# Patient Record
Sex: FEMALE | Race: WHITE | NOT HISPANIC OR LATINO | URBAN - METROPOLITAN AREA
[De-identification: names, ages, dates, MRNs, and addresses within clinical notes are randomized per-mention and may not be internally consistent; named-entity substitution may affect disease eponyms.]

---

## 2019-03-25 ENCOUNTER — EMERGENCY (EMERGENCY)
Facility: HOSPITAL | Age: 29
LOS: 1 days | End: 2019-03-25
Attending: EMERGENCY MEDICINE
Payer: COMMERCIAL

## 2019-03-25 VITALS
OXYGEN SATURATION: 100 % | TEMPERATURE: 98 F | HEART RATE: 87 BPM | SYSTOLIC BLOOD PRESSURE: 130 MMHG | DIASTOLIC BLOOD PRESSURE: 70 MMHG | WEIGHT: 134.92 LBS | HEIGHT: 67 IN | RESPIRATION RATE: 17 BRPM

## 2019-03-25 VITALS
RESPIRATION RATE: 16 BRPM | DIASTOLIC BLOOD PRESSURE: 76 MMHG | SYSTOLIC BLOOD PRESSURE: 127 MMHG | TEMPERATURE: 99 F | HEART RATE: 80 BPM | OXYGEN SATURATION: 100 %

## 2019-03-25 LAB
ALBUMIN SERPL ELPH-MCNC: 4.8 G/DL — SIGNIFICANT CHANGE UP (ref 3.3–5)
ALP SERPL-CCNC: 88 U/L — SIGNIFICANT CHANGE UP (ref 40–120)
ALT FLD-CCNC: 16 U/L — SIGNIFICANT CHANGE UP (ref 10–45)
ANION GAP SERPL CALC-SCNC: 13 MMOL/L — SIGNIFICANT CHANGE UP (ref 5–17)
AST SERPL-CCNC: 15 U/L — SIGNIFICANT CHANGE UP (ref 10–40)
BASOPHILS # BLD AUTO: 0.1 K/UL — SIGNIFICANT CHANGE UP (ref 0–0.2)
BASOPHILS NFR BLD AUTO: 0.7 % — SIGNIFICANT CHANGE UP (ref 0–2)
BILIRUB SERPL-MCNC: 0.3 MG/DL — SIGNIFICANT CHANGE UP (ref 0.2–1.2)
BUN SERPL-MCNC: 9 MG/DL — SIGNIFICANT CHANGE UP (ref 7–23)
CALCIUM SERPL-MCNC: 9.6 MG/DL — SIGNIFICANT CHANGE UP (ref 8.4–10.5)
CHLORIDE SERPL-SCNC: 103 MMOL/L — SIGNIFICANT CHANGE UP (ref 96–108)
CO2 SERPL-SCNC: 23 MMOL/L — SIGNIFICANT CHANGE UP (ref 22–31)
CREAT SERPL-MCNC: 0.72 MG/DL — SIGNIFICANT CHANGE UP (ref 0.5–1.3)
EOSINOPHIL # BLD AUTO: 0.1 K/UL — SIGNIFICANT CHANGE UP (ref 0–0.5)
EOSINOPHIL NFR BLD AUTO: 1.2 % — SIGNIFICANT CHANGE UP (ref 0–6)
GLUCOSE SERPL-MCNC: 100 MG/DL — HIGH (ref 70–99)
HCG SERPL-ACNC: <2 MIU/ML — SIGNIFICANT CHANGE UP
HCT VFR BLD CALC: 42.7 % — SIGNIFICANT CHANGE UP (ref 34.5–45)
HGB BLD-MCNC: 14.7 G/DL — SIGNIFICANT CHANGE UP (ref 11.5–15.5)
LIDOCAIN IGE QN: 203 U/L — HIGH (ref 7–60)
LYMPHOCYTES # BLD AUTO: 2.9 K/UL — SIGNIFICANT CHANGE UP (ref 1–3.3)
LYMPHOCYTES # BLD AUTO: 25.2 % — SIGNIFICANT CHANGE UP (ref 13–44)
MCHC RBC-ENTMCNC: 29.4 PG — SIGNIFICANT CHANGE UP (ref 27–34)
MCHC RBC-ENTMCNC: 34.5 GM/DL — SIGNIFICANT CHANGE UP (ref 32–36)
MCV RBC AUTO: 85.3 FL — SIGNIFICANT CHANGE UP (ref 80–100)
MONOCYTES # BLD AUTO: 0.7 K/UL — SIGNIFICANT CHANGE UP (ref 0–0.9)
MONOCYTES NFR BLD AUTO: 6.6 % — SIGNIFICANT CHANGE UP (ref 2–14)
NEUTROPHILS # BLD AUTO: 7.5 K/UL — HIGH (ref 1.8–7.4)
NEUTROPHILS NFR BLD AUTO: 66.4 % — SIGNIFICANT CHANGE UP (ref 43–77)
PLATELET # BLD AUTO: 377 K/UL — SIGNIFICANT CHANGE UP (ref 150–400)
POTASSIUM SERPL-MCNC: 3.8 MMOL/L — SIGNIFICANT CHANGE UP (ref 3.5–5.3)
POTASSIUM SERPL-SCNC: 3.8 MMOL/L — SIGNIFICANT CHANGE UP (ref 3.5–5.3)
PROT SERPL-MCNC: 7.9 G/DL — SIGNIFICANT CHANGE UP (ref 6–8.3)
RBC # BLD: 5 M/UL — SIGNIFICANT CHANGE UP (ref 3.8–5.2)
RBC # FLD: 13 % — SIGNIFICANT CHANGE UP (ref 10.3–14.5)
SODIUM SERPL-SCNC: 139 MMOL/L — SIGNIFICANT CHANGE UP (ref 135–145)
WBC # BLD: 11.4 K/UL — HIGH (ref 3.8–10.5)
WBC # FLD AUTO: 11.4 K/UL — HIGH (ref 3.8–10.5)

## 2019-03-25 PROCEDURE — 85027 COMPLETE CBC AUTOMATED: CPT

## 2019-03-25 PROCEDURE — 99284 EMERGENCY DEPT VISIT MOD MDM: CPT

## 2019-03-25 PROCEDURE — 84702 CHORIONIC GONADOTROPIN TEST: CPT

## 2019-03-25 PROCEDURE — 80053 COMPREHEN METABOLIC PANEL: CPT

## 2019-03-25 PROCEDURE — 76705 ECHO EXAM OF ABDOMEN: CPT

## 2019-03-25 PROCEDURE — 99284 EMERGENCY DEPT VISIT MOD MDM: CPT | Mod: 25

## 2019-03-25 PROCEDURE — 76705 ECHO EXAM OF ABDOMEN: CPT | Mod: 26,RT

## 2019-03-25 PROCEDURE — 83690 ASSAY OF LIPASE: CPT

## 2019-03-25 RX ORDER — ONDANSETRON 8 MG/1
1 TABLET, FILM COATED ORAL
Qty: 12 | Refills: 0 | OUTPATIENT
Start: 2019-03-25

## 2019-03-25 NOTE — ED STATDOCS - OBJECTIVE STATEMENT
27 y/o female with no significant pmhx or hx of gallstones c/o intermittent epigastric pain today. Pt went to urgent care and performed blood test which showed elevated lipase levels (300). Pt was directed to ED for further eval. Has pain in room. Denies daily drinking or binge drinking; last drink x4 days ago (3.5 beers). LNMP: 1.5 weeks ago.    **pt seen in waiting room for triage - comprehensive history and physical is not performed by me - patient to be sent to main ED for full history / physical and medical evaluation - all orders placed by me to be followed by MD in main ED**

## 2019-03-25 NOTE — ED ADULT NURSE NOTE - OBJECTIVE STATEMENT
29 y/o F presents to the ED c/o abnormal labs.  Pt reports upper abdominal pain which started late last night.  Pt states the pain is mild to moderate and states the pain continued today.  Pt states earlier she went to urgent care for the pain and was sent to ED for high lipase level.  Pt reports mild nausea in ED.  Pt state she is a "occasional social drinker".  Patient is A&Ox4. Face is symmetrical. PERRL 3mmB. Speech is clear. Patient is moving all extremities with 5/5 strength and walks with steady gait. No chest pain, shortness of breath, diaphoresis, palpitations, left arm pain.  VSS,      Patient reporting upper abdominal pains beginning last night.  Moderate intensity, steady throughout day, no nausea, vomiting, or diarrhea.  Non radiating.  No similar prior pains in past.  No change with eating and no appetite changes, no prior surgeries.  Occasional social drinker.  Seen at urgent care earlier for same, had labs sent with reportedly elevated amylase/lipase there.

## 2019-03-25 NOTE — ED PROVIDER NOTE - NSFOLLOWUPCLINICS_GEN_ALL_ED_FT
[facility]  Gastroenterology  [address] [place] [id number]  Phone: [phone]  Fax:   Follow Up Time:

## 2019-03-25 NOTE — ED PROVIDER NOTE - OBJECTIVE STATEMENT
Patient reporting upper abdominal pains beginning last night.  Moderate intensity, steady throughout day, no nausea, vomiting, or diarrhea.  Non radiating.  No similar prior pains in past.  No change with eating and no appetite changes, no prior surgeries.  Occasional social drinker.  Seen at urgent care earlier for same, had labs sent with reportedly elevated amylase/lipase there. Patient reporting upper abdominal pains beginning last night.  Moderate intensity, steady throughout day, no nausea, vomiting, or diarrhea.  Non radiating.  No similar prior pains in past.  No change with eating and no appetite changes, no prior surgeries.  Occasional social drinker.  Seen at urgent care earlier for same, had labs sent with reportedly elevated amylase/lipase there.  Mark Schwartzankush FERNANDO   Patient denies significant pmhx, reports occasional alcohol use (3 beers 2 days ago) presented to ED c/o epigastric pain since last night. Pt reports going to urgent care today and had elevated amylase/lipase (162/384). Pt states she was sent to ED for further evaluation. Pt currently reports mild epigastric discomfort, denies fever, chills, nausea, vomiting, chest pain, sob, dyspnea, diarrhea, constipation, diarrhea, dysuria or other associated symptoms.

## 2019-03-25 NOTE — ED PROVIDER NOTE - NSFOLLOWUPINSTRUCTIONS_ED_ALL_ED_FT
Stay hydrated, follow up with GI clinic this week.  Follow up with your Primary Care Physician within the next 2-3 days  Bring a copy of your test results with you to your appointment  Return to the Emergency Room if you experience new or worsening symptoms

## 2019-03-25 NOTE — ED PROVIDER NOTE - CLINICAL SUMMARY MEDICAL DECISION MAKING FREE TEXT BOX
Patient presenting with epigastric pains and elevated amylase/lipase as outpatient.  No risk factors for pancreatitis and relatively benign exam.  Plan for repeat labs, RUQ US, lipid panel.  Offered pain control but patient declined.  Disposition to be determined by workup.

## 2019-03-25 NOTE — ED PROVIDER NOTE - GASTROINTESTINAL, MLM
Abdomen soft, tender in epigastrum without rebound or guarding, no RUQ tenderness, negative [place]

## 2019-03-25 NOTE — ED PROVIDER NOTE - ATTENDING CONTRIBUTION TO CARE
Patient seen and evaluated with resident/NP/PA, however HPI, ROS, PE and MDM as documented authored by myself unless otherwise noted- Rehan Davis MD

## 2019-03-25 NOTE — ED PROVIDER NOTE - PROGRESS NOTE DETAILS
PROGRESS NOTE PA SHELTON: Pt resting in stretcher in NAD. Patient tolerating PO, BS x4 with soft, nontender abdomen. Pt without complaints. US abdomen unremarkable, labs unremarkable w/ Lipase trending down to 203. Pt advised to stay hydrated, follow up with GI clinic this week. If symptoms worsen, return to ED. c/d/w Dr. Davis

## 2020-10-27 NOTE — ED ADULT NURSE NOTE - NSIMPLEMENTINTERV_GEN_ALL_ED
Spoke to Daphne from [facility] and informed her we will put in another order for quad screen and it will be filled out and put in system.   Implemented All Universal Safety Interventions:  [place] to call system. Call bell, personal items and telephone within reach. Instruct patient to call for assistance. Room bathroom lighting operational. Non-slip footwear when patient is off stretcher. Physically safe environment: no spills, clutter or unnecessary equipment. Stretcher in lowest position, wheels locked, appropriate side rails in place.

## 2022-08-31 NOTE — ED ADULT TRIAGE NOTE - AS HEIGHT TYPE
accu check 105.  Q/C passed and pt is putting on gown and giving a urine sample      Indigo Ann RN  08/31/22 8550 stated

## 2022-09-02 NOTE — ED ADULT NURSE NOTE - CAS TRG GENERAL NORM CIRC DET
Strong peripheral pulses/Capillary refill less/equal to 2 seconds
Detail Level: Detailed
Render In Strict Bullet Format?: No
Plan: Will start maintenance therapy with Azelaic acid and niacinmide at 3 month f/u.
Initiate Treatment: Delight (Hq 5%, KA 2%, VitC 2.5%, tret 0.025%, HC 1%, HA 0.1%) \\nQuantity: 30.0 g\\nDelight (Hq 5%, KA 2%, VitC 2.5%, tret 0.025%, HC 1%, HA 0.1%) (5% hydroquinone (bulk))\\nSig: AAA dark spots on cheeks nightly for up to 3 months then stop.